# Patient Record
Sex: FEMALE | Race: WHITE
[De-identification: names, ages, dates, MRNs, and addresses within clinical notes are randomized per-mention and may not be internally consistent; named-entity substitution may affect disease eponyms.]

---

## 2022-04-20 ENCOUNTER — HOSPITAL ENCOUNTER (OUTPATIENT)
Dept: HOSPITAL 100 - MTLAB | Age: 52
Discharge: HOME | End: 2022-04-20
Payer: COMMERCIAL

## 2022-04-20 DIAGNOSIS — R00.2: ICD-10-CM

## 2022-04-20 DIAGNOSIS — E78.5: ICD-10-CM

## 2022-04-20 DIAGNOSIS — R76.8: ICD-10-CM

## 2022-04-20 DIAGNOSIS — M06.4: Primary | ICD-10-CM

## 2022-04-20 LAB
ALANINE AMINOTRANSFER ALT/SGPT: 25 U/L (ref 13–56)
ALBUMIN SERPL-MCNC: 3.6 G/DL (ref 3.2–5)
ALKALINE PHOSPHATASE: 73 U/L (ref 45–117)
ANION GAP: 8 (ref 5–15)
APTT PPP: 26.8 SECONDS (ref 24.1–36.2)
AST(SGOT): 19 U/L (ref 15–37)
BUN SERPL-MCNC: 10 MG/DL (ref 7–18)
BUN/CREAT RATIO: 11 RATIO (ref 10–20)
CALCIUM SERPL-MCNC: 8.6 MG/DL (ref 8.5–10.1)
CARBON DIOXIDE: 24 MMOL/L (ref 21–32)
CHLORIDE: 106 MMOL/L (ref 98–107)
CRP SERPL-MCNC: 9.89 MG/L (ref 0–3)
DEPRECATED RDW RBC: 42.1 FL (ref 35.1–43.9)
ERYTHROCYTE [DISTWIDTH] IN BLOOD: 13.3 % (ref 11.6–14.6)
EST GLOM FILT RATE - AFR AMER: 85 ML/MIN (ref 60–?)
EXAGEN: (no result)
GLOBULIN: 4 G/DL (ref 2.2–4.2)
GLUCOSE: 107 MG/DL (ref 74–106)
HCT VFR BLD AUTO: 38.1 % (ref 37–47)
HEMOGLOBIN: 13 G/DL (ref 12–15)
HGB BLD-MCNC: 13 G/DL (ref 12–15)
IMMATURE GRANULOCYTES COUNT: 0.03 X10^3/UL (ref 0–0)
MCV RBC: 87.2 FL (ref 81–99)
MEAN CORP HGB CONC: 34.1 G/DL (ref 32–36)
MEAN PLATELET VOL.: 9.8 FL (ref 6.2–12)
NRBC FLAGGED BY ANALYZER: 0 % (ref 0–5)
PLATELET # BLD: 247 K/MM3 (ref 150–450)
PLATELET COUNT: 247 K/MM3 (ref 150–450)
POTASSIUM: 3.8 MMOL/L (ref 3.5–5.1)
PROTHROMBIN TIME (PROTIME)PT.: 11.7 SECONDS (ref 11.7–14.9)
RBC # BLD AUTO: 4.37 M/MM3 (ref 4.2–5.4)
RBC DISTRIBUTION WIDTH CV: 13.3 % (ref 11.6–14.6)
RBC DISTRIBUTION WIDTH SD: 42.1 FL (ref 35.1–43.9)
SP GR UR: 1.01 (ref 1–1.03)
URINE PRESERVATIVE: (no result)
WBC # BLD AUTO: 9.5 K/MM3 (ref 4.4–11)
WHITE BLOOD COUNT: 9.5 K/MM3 (ref 4.4–11)

## 2022-04-20 PROCEDURE — 82570 ASSAY OF URINE CREATININE: CPT

## 2022-04-20 PROCEDURE — 84156 ASSAY OF PROTEIN URINE: CPT

## 2022-04-20 PROCEDURE — 86140 C-REACTIVE PROTEIN: CPT

## 2022-04-20 PROCEDURE — 85598 HEXAGNAL PHOSPH PLTLT NEUTRL: CPT

## 2022-04-20 PROCEDURE — 85025 COMPLETE CBC W/AUTO DIFF WBC: CPT

## 2022-04-20 PROCEDURE — 87340 HEPATITIS B SURFACE AG IA: CPT

## 2022-04-20 PROCEDURE — 80053 COMPREHEN METABOLIC PANEL: CPT

## 2022-04-20 PROCEDURE — 81002 URINALYSIS NONAUTO W/O SCOPE: CPT

## 2022-04-20 PROCEDURE — 85730 THROMBOPLASTIN TIME PARTIAL: CPT

## 2022-04-20 PROCEDURE — 86803 HEPATITIS C AB TEST: CPT

## 2022-04-20 PROCEDURE — 85652 RBC SED RATE AUTOMATED: CPT

## 2022-04-20 PROCEDURE — 85670 THROMBIN TIME PLASMA: CPT

## 2022-04-20 PROCEDURE — 85610 PROTHROMBIN TIME: CPT

## 2022-04-20 PROCEDURE — 86706 HEP B SURFACE ANTIBODY: CPT

## 2022-04-21 ENCOUNTER — HOSPITAL ENCOUNTER (OUTPATIENT)
Age: 52
Discharge: HOME | End: 2022-04-21
Payer: COMMERCIAL

## 2022-04-21 DIAGNOSIS — J30.9: ICD-10-CM

## 2022-04-21 DIAGNOSIS — R00.2: ICD-10-CM

## 2022-04-21 DIAGNOSIS — R76.8: ICD-10-CM

## 2022-04-21 DIAGNOSIS — E78.5: ICD-10-CM

## 2022-04-21 DIAGNOSIS — M06.4: Primary | ICD-10-CM

## 2022-04-21 LAB
CREAT UR-MCNC: 33 MG/DL
PROT UR-MCNC: < 6 MG/DL (ref ?–11.9)
PROT/CREAT UR: (no result) MG/G CRE (ref 0–200)

## 2022-04-21 PROCEDURE — 82570 ASSAY OF URINE CREATININE: CPT

## 2022-04-21 PROCEDURE — 84156 ASSAY OF PROTEIN URINE: CPT

## 2022-04-22 LAB
DILUTE RUSSELL VIPER VENOM: 41.2 SEC (ref 0–47)
PTT-LA: 33.3 SEC (ref 0–51.9)
SCREEN DRVVT: 41.2 SEC (ref 0–47)

## 2023-04-17 ENCOUNTER — OFFICE VISIT (OUTPATIENT)
Dept: PRIMARY CARE | Facility: CLINIC | Age: 53
End: 2023-04-17
Payer: COMMERCIAL

## 2023-04-17 ENCOUNTER — TELEPHONE (OUTPATIENT)
Dept: PRIMARY CARE | Facility: CLINIC | Age: 53
End: 2023-04-17

## 2023-04-17 ENCOUNTER — LAB (OUTPATIENT)
Dept: LAB | Facility: LAB | Age: 53
End: 2023-04-17
Payer: COMMERCIAL

## 2023-04-17 VITALS
DIASTOLIC BLOOD PRESSURE: 80 MMHG | OXYGEN SATURATION: 99 % | HEART RATE: 68 BPM | HEIGHT: 64 IN | WEIGHT: 185.3 LBS | BODY MASS INDEX: 31.63 KG/M2 | SYSTOLIC BLOOD PRESSURE: 118 MMHG

## 2023-04-17 DIAGNOSIS — E53.8 VITAMIN B12 DEFICIENCY: ICD-10-CM

## 2023-04-17 DIAGNOSIS — E78.2 HYPERLIPEMIA, MIXED: Primary | ICD-10-CM

## 2023-04-17 DIAGNOSIS — E78.2 HYPERLIPEMIA, MIXED: ICD-10-CM

## 2023-04-17 PROBLEM — R55 SYNCOPAL EPISODES: Status: ACTIVE | Noted: 2023-04-17

## 2023-04-17 PROBLEM — R79.89 LOW VITAMIN D LEVEL: Status: ACTIVE | Noted: 2023-04-17

## 2023-04-17 PROBLEM — R76.8 POSITIVE ANA (ANTINUCLEAR ANTIBODY): Status: ACTIVE | Noted: 2023-04-17

## 2023-04-17 PROBLEM — R00.2 PALPITATIONS: Status: ACTIVE | Noted: 2023-04-17

## 2023-04-17 PROBLEM — H02.88A MEIBOMIAN GLAND DYSFUNCTION (MGD) OF UPPER AND LOWER LIDS OF BOTH EYES: Status: ACTIVE | Noted: 2017-05-01

## 2023-04-17 PROBLEM — R55 SYNCOPAL EPISODES: Status: RESOLVED | Noted: 2023-04-17 | Resolved: 2023-04-17

## 2023-04-17 PROBLEM — H02.88B MEIBOMIAN GLAND DYSFUNCTION (MGD) OF UPPER AND LOWER LIDS OF BOTH EYES: Status: ACTIVE | Noted: 2017-05-01

## 2023-04-17 PROBLEM — M25.50 JOINT PAIN: Status: ACTIVE | Noted: 2023-04-17

## 2023-04-17 LAB
ALANINE AMINOTRANSFERASE (SGPT) (U/L) IN SER/PLAS: 26 U/L (ref 7–45)
ALBUMIN (G/DL) IN SER/PLAS: 4.4 G/DL (ref 3.4–5)
ALKALINE PHOSPHATASE (U/L) IN SER/PLAS: 119 U/L (ref 33–110)
ANION GAP IN SER/PLAS: 10 MMOL/L (ref 10–20)
ASPARTATE AMINOTRANSFERASE (SGOT) (U/L) IN SER/PLAS: 26 U/L (ref 9–39)
BASOPHILS (10*3/UL) IN BLOOD BY AUTOMATED COUNT: 0.03 X10E9/L (ref 0–0.1)
BASOPHILS/100 LEUKOCYTES IN BLOOD BY AUTOMATED COUNT: 0.4 % (ref 0–2)
BILIRUBIN TOTAL (MG/DL) IN SER/PLAS: 0.6 MG/DL (ref 0–1.2)
CALCIUM (MG/DL) IN SER/PLAS: 9.6 MG/DL (ref 8.6–10.3)
CARBON DIOXIDE, TOTAL (MMOL/L) IN SER/PLAS: 30 MMOL/L (ref 21–32)
CHLORIDE (MMOL/L) IN SER/PLAS: 104 MMOL/L (ref 98–107)
CHOLESTEROL (MG/DL) IN SER/PLAS: 166 MG/DL (ref 0–199)
CHOLESTEROL IN HDL (MG/DL) IN SER/PLAS: 55 MG/DL
CHOLESTEROL/HDL RATIO: 3
COBALAMIN (VITAMIN B12) (PG/ML) IN SER/PLAS: 631 PG/ML (ref 211–911)
CREATININE (MG/DL) IN SER/PLAS: 0.77 MG/DL (ref 0.5–1.05)
EOSINOPHILS (10*3/UL) IN BLOOD BY AUTOMATED COUNT: 0.24 X10E9/L (ref 0–0.7)
EOSINOPHILS/100 LEUKOCYTES IN BLOOD BY AUTOMATED COUNT: 3 % (ref 0–6)
ERYTHROCYTE DISTRIBUTION WIDTH (RATIO) BY AUTOMATED COUNT: 13.4 % (ref 11.5–14.5)
ERYTHROCYTE MEAN CORPUSCULAR HEMOGLOBIN CONCENTRATION (G/DL) BY AUTOMATED: 31.7 G/DL (ref 32–36)
ERYTHROCYTE MEAN CORPUSCULAR VOLUME (FL) BY AUTOMATED COUNT: 87 FL (ref 80–100)
ERYTHROCYTES (10*6/UL) IN BLOOD BY AUTOMATED COUNT: 4.75 X10E12/L (ref 4–5.2)
GFR FEMALE: >90 ML/MIN/1.73M2
GLUCOSE (MG/DL) IN SER/PLAS: 92 MG/DL (ref 74–99)
HEMATOCRIT (%) IN BLOOD BY AUTOMATED COUNT: 41.3 % (ref 36–46)
HEMOGLOBIN (G/DL) IN BLOOD: 13.1 G/DL (ref 12–16)
IMMATURE GRANULOCYTES/100 LEUKOCYTES IN BLOOD BY AUTOMATED COUNT: 0.4 % (ref 0–0.9)
LDL: 78 MG/DL (ref 0–99)
LEUKOCYTES (10*3/UL) IN BLOOD BY AUTOMATED COUNT: 8.1 X10E9/L (ref 4.4–11.3)
LYMPHOCYTES (10*3/UL) IN BLOOD BY AUTOMATED COUNT: 2.87 X10E9/L (ref 1.2–4.8)
LYMPHOCYTES/100 LEUKOCYTES IN BLOOD BY AUTOMATED COUNT: 35.6 % (ref 13–44)
MONOCYTES (10*3/UL) IN BLOOD BY AUTOMATED COUNT: 0.42 X10E9/L (ref 0.1–1)
MONOCYTES/100 LEUKOCYTES IN BLOOD BY AUTOMATED COUNT: 5.2 % (ref 2–10)
NEUTROPHILS (10*3/UL) IN BLOOD BY AUTOMATED COUNT: 4.47 X10E9/L (ref 1.2–7.7)
NEUTROPHILS/100 LEUKOCYTES IN BLOOD BY AUTOMATED COUNT: 55.4 % (ref 40–80)
PLATELETS (10*3/UL) IN BLOOD AUTOMATED COUNT: 219 X10E9/L (ref 150–450)
POTASSIUM (MMOL/L) IN SER/PLAS: 4.2 MMOL/L (ref 3.5–5.3)
PROTEIN TOTAL: 6.3 G/DL (ref 6.4–8.2)
SODIUM (MMOL/L) IN SER/PLAS: 140 MMOL/L (ref 136–145)
THYROTROPIN (MIU/L) IN SER/PLAS BY DETECTION LIMIT <= 0.05 MIU/L: 2.29 MIU/L (ref 0.44–3.98)
TRIGLYCERIDE (MG/DL) IN SER/PLAS: 163 MG/DL (ref 0–149)
UREA NITROGEN (MG/DL) IN SER/PLAS: 12 MG/DL (ref 6–23)
VLDL: 33 MG/DL (ref 0–40)

## 2023-04-17 PROCEDURE — 99213 OFFICE O/P EST LOW 20 MIN: CPT | Performed by: FAMILY MEDICINE

## 2023-04-17 PROCEDURE — 1036F TOBACCO NON-USER: CPT | Performed by: FAMILY MEDICINE

## 2023-04-17 PROCEDURE — 80061 LIPID PANEL: CPT

## 2023-04-17 PROCEDURE — 80053 COMPREHEN METABOLIC PANEL: CPT

## 2023-04-17 PROCEDURE — 84443 ASSAY THYROID STIM HORMONE: CPT

## 2023-04-17 PROCEDURE — 82607 VITAMIN B-12: CPT

## 2023-04-17 PROCEDURE — 85025 COMPLETE CBC W/AUTO DIFF WBC: CPT

## 2023-04-17 PROCEDURE — 36415 COLL VENOUS BLD VENIPUNCTURE: CPT

## 2023-04-17 RX ORDER — VIT C/E/ZN/COPPR/LUTEIN/ZEAXAN 250MG-90MG
CAPSULE ORAL
COMMUNITY
End: 2023-11-06 | Stop reason: ALTCHOICE

## 2023-04-17 RX ORDER — ALBUTEROL SULFATE 90 UG/1
2 AEROSOL, METERED RESPIRATORY (INHALATION) EVERY 6 HOURS PRN
COMMUNITY
Start: 2021-03-09 | End: 2023-11-06 | Stop reason: ALTCHOICE

## 2023-04-17 RX ORDER — ATORVASTATIN CALCIUM 40 MG/1
40 TABLET, FILM COATED ORAL DAILY
COMMUNITY
End: 2023-04-17 | Stop reason: SINTOL

## 2023-04-17 RX ORDER — ROSUVASTATIN CALCIUM 10 MG/1
10 TABLET, COATED ORAL DAILY
Qty: 30 TABLET | Refills: 1 | Status: SHIPPED | OUTPATIENT
Start: 2023-04-17 | End: 2023-05-19 | Stop reason: ALTCHOICE

## 2023-04-17 RX ORDER — METOPROLOL SUCCINATE 50 MG/1
50 TABLET, EXTENDED RELEASE ORAL
COMMUNITY
Start: 2021-08-20 | End: 2023-06-05

## 2023-04-17 RX ORDER — CHOLECALCIFEROL (VITAMIN D3) 25 MCG
TABLET,CHEWABLE ORAL EVERY 24 HOURS
COMMUNITY
End: 2023-11-06 | Stop reason: ALTCHOICE

## 2023-04-17 ASSESSMENT — PATIENT HEALTH QUESTIONNAIRE - PHQ9
1. LITTLE INTEREST OR PLEASURE IN DOING THINGS: NOT AT ALL
SUM OF ALL RESPONSES TO PHQ9 QUESTIONS 1 AND 2: 0
2. FEELING DOWN, DEPRESSED OR HOPELESS: NOT AT ALL

## 2023-04-17 NOTE — PROGRESS NOTES
Subjective   Lona Dawson is a 52 y.o. female who presents for No chief complaint on file..  Here for follow up high chol, vitamin B12 def.  She has been working on diet/exercise.  She is interested in either cutting back or changing her cholesterol medication based on recent labs.  She has noticed some issues with constipation since starting the medication.      She goes to GYN - Dr Mcgill [facility] - mammogram last fall.      Cologuard neg 11/7/2022.                Objective   Visit Vitals  /80 (BP Location: Left arm, Patient Position: Sitting)   Pulse 68      Physical Exam  Vitals reviewed.   Constitutional:       General: She is not in acute distress.  Cardiovascular:      Rate and Rhythm: Normal rate and regular rhythm.      Heart sounds: No murmur heard.  Pulmonary:      Effort: Pulmonary effort is normal. No respiratory distress.      Breath sounds: Normal breath sounds.   Skin:     General: Skin is warm and dry.   Neurological:      General: No focal deficit present.      Mental Status: She is alert. Mental status is at baseline.         Assessment/Plan   Problem List Items Addressed This Visit          Endocrine/Metabolic    Vitamin B12 deficiency    Relevant Orders    CBC and Auto Differential    Comprehensive Metabolic Panel    Lipid Panel    TSH with reflex to Free T4 if abnormal    Vitamin B12    Follow Up In Primary Care       Other    Hyperlipemia, mixed - Primary    Relevant Orders    CBC and Auto Differential    Comprehensive Metabolic Panel    Lipid Panel    TSH with reflex to Free T4 if abnormal    Vitamin B12    Follow Up In Primary Care          Diamond Gill MD

## 2023-04-17 NOTE — TELEPHONE ENCOUNTER
Phone call to pt with all above.  Pt is agreeable to starting rosuvastatin 10 mg daily.  Pt advised a 30 day supply would be sent in to make sure she can tolerate.  Pt voiced understanding and appreciation.

## 2023-04-17 NOTE — TELEPHONE ENCOUNTER
----- Message from Diamond Gill MD sent at 4/17/2023 12:53 PM EDT -----  Please let patient know that overall her labs were ok - cholesterol is a little higher than it was in January but still much better than it was 6 months ago.  If she would like to try to either cut the atorvastatin dose in half or switch to rosuvastatin 10 mg we could try that.  Thanks.

## 2023-05-19 ENCOUNTER — OFFICE VISIT (OUTPATIENT)
Dept: PRIMARY CARE | Facility: CLINIC | Age: 53
End: 2023-05-19
Payer: COMMERCIAL

## 2023-05-19 VITALS
DIASTOLIC BLOOD PRESSURE: 74 MMHG | BODY MASS INDEX: 31.24 KG/M2 | HEIGHT: 64 IN | SYSTOLIC BLOOD PRESSURE: 124 MMHG | WEIGHT: 183 LBS | HEART RATE: 80 BPM

## 2023-05-19 DIAGNOSIS — E78.2 HYPERLIPEMIA, MIXED: ICD-10-CM

## 2023-05-19 DIAGNOSIS — N30.01 ACUTE CYSTITIS WITH HEMATURIA: ICD-10-CM

## 2023-05-19 DIAGNOSIS — R30.0 DYSURIA: Primary | ICD-10-CM

## 2023-05-19 LAB
POC APPEARANCE, URINE: CLEAR
POC BILIRUBIN, URINE: NEGATIVE
POC BLOOD, URINE: ABNORMAL
POC COLOR, URINE: YELLOW
POC GLUCOSE, URINE: NEGATIVE MG/DL
POC KETONES, URINE: NEGATIVE MG/DL
POC LEUKOCYTES, URINE: NEGATIVE
POC NITRITE,URINE: NEGATIVE
POC PH, URINE: 6 PH
POC PROTEIN, URINE: NEGATIVE MG/DL
POC SPECIFIC GRAVITY, URINE: 1.01
POC UROBILINOGEN, URINE: 0.2 EU/DL

## 2023-05-19 PROCEDURE — 81003 URINALYSIS AUTO W/O SCOPE: CPT | Performed by: FAMILY MEDICINE

## 2023-05-19 PROCEDURE — 1036F TOBACCO NON-USER: CPT | Performed by: FAMILY MEDICINE

## 2023-05-19 PROCEDURE — 87086 URINE CULTURE/COLONY COUNT: CPT

## 2023-05-19 PROCEDURE — 99213 OFFICE O/P EST LOW 20 MIN: CPT | Performed by: FAMILY MEDICINE

## 2023-05-19 RX ORDER — CIPROFLOXACIN 250 MG/1
250 TABLET, FILM COATED ORAL 2 TIMES DAILY
Qty: 14 TABLET | Refills: 0 | Status: SHIPPED | OUTPATIENT
Start: 2023-05-19 | End: 2023-05-26

## 2023-05-19 RX ORDER — ATORVASTATIN CALCIUM 20 MG/1
20 TABLET, FILM COATED ORAL DAILY
Qty: 30 TABLET | Refills: 5 | Status: SHIPPED | OUTPATIENT
Start: 2023-05-19 | End: 2023-11-06 | Stop reason: SDUPTHER

## 2023-05-19 RX ORDER — FLUCONAZOLE 150 MG/1
150 TABLET ORAL ONCE
Qty: 1 TABLET | Refills: 1 | Status: SHIPPED | OUTPATIENT
Start: 2023-05-19 | End: 2023-05-19

## 2023-05-19 ASSESSMENT — ENCOUNTER SYMPTOMS
HEMATURIA: 1
FREQUENCY: 1
NAUSEA: 1
VOMITING: 1
CHILLS: 1
FLANK PAIN: 1
DYSURIA: 1

## 2023-05-19 NOTE — PROGRESS NOTES
Subjective   Lona Dawson is a 52 y.o. female who presents for Follow-up (Issues with urinating / low grade fever. States she has a dull pain after voidng and back pain. ).  Here c/o UTI symptoms - burning with urination, had some trouble urinating on Monday, did have some nausea and vomiting after that.  Since then she has had some low back pain, aching, possible low grade fever.  Yesterday had some pain after urinating.  Three weeks ago she did have some blood one time.  She has had kidney stones in the past.      She also mentions that she would like to stop the crestor and switch back to atorvastatin - we will do 20 mg daily and see how she tolerates that.      Difficulty Urinating   This is a new problem. The current episode started 1 to 4 weeks ago. The problem occurs every urination. The problem has been waxing and waning. The quality of the pain is described as stabbing. The pain is at a severity of 3/10. There has been no fever. She is Sexually active. There is A history of pyelonephritis. Associated symptoms include chills, flank pain, frequency, hematuria, hesitancy, nausea and vomiting.           Objective   Visit Vitals  /74 (BP Location: Left arm, Patient Position: Sitting)   Pulse 80      Physical Exam  Vitals reviewed.   Constitutional:       General: She is not in acute distress.  Pulmonary:      Effort: Pulmonary effort is normal. No respiratory distress.   Skin:     General: Skin is warm and dry.   Neurological:      General: No focal deficit present.      Mental Status: She is alert. Mental status is at baseline.         Assessment/Plan   Problem List Items Addressed This Visit          Other    Hyperlipemia, mixed    Relevant Medications    atorvastatin (Lipitor) 20 mg tablet     Other Visit Diagnoses       Dysuria    -  Primary    Relevant Orders    POCT UA Automated manually resulted (Completed)    Urine Culture    Acute cystitis with hematuria        Relevant Medications     ciprofloxacin (Cipro) 250 mg tablet    fluconazole (Diflucan) 150 mg tablet               Diamond Gill MD

## 2023-05-20 LAB — URINE CULTURE: NORMAL

## 2023-05-23 ENCOUNTER — TELEPHONE (OUTPATIENT)
Dept: PRIMARY CARE | Facility: CLINIC | Age: 53
End: 2023-05-23
Payer: COMMERCIAL

## 2023-05-23 NOTE — TELEPHONE ENCOUNTER
----- Message from Diamond Gill MD sent at 5/21/2023  2:34 PM EDT -----  Please let patient know that her urine culture was negative - how is she feeling?

## 2023-06-02 ENCOUNTER — TELEPHONE (OUTPATIENT)
Dept: PRIMARY CARE | Facility: CLINIC | Age: 53
End: 2023-06-02
Payer: COMMERCIAL

## 2023-06-02 DIAGNOSIS — R30.0 DYSURIA: Primary | ICD-10-CM

## 2023-06-02 NOTE — TELEPHONE ENCOUNTER
Patient would like a referral for urology, preferably a female. I suggested Dr. Blevins in [place] and she was okay with that. She would like an order placed to have her urine rechecked if possible and she can go up to the hospital and have it done.

## 2023-06-02 NOTE — TELEPHONE ENCOUNTER
Patient stated she was in a couple weeks ago and was treated for UTI. She said she was starting to feel better, but now she is not. She said she is starting to have pain with urination again and feeling run down. She wanted to know if you want to see her again or if she should see a specialist?

## 2023-06-02 NOTE — TELEPHONE ENCOUNTER
I would suggest we refer to urology.  We could order another urine test in the meantime if she would like.

## 2023-06-03 ENCOUNTER — LAB (OUTPATIENT)
Dept: LAB | Facility: LAB | Age: 53
End: 2023-06-03
Payer: COMMERCIAL

## 2023-06-03 DIAGNOSIS — R00.2 PALPITATIONS: Primary | ICD-10-CM

## 2023-06-03 DIAGNOSIS — R30.0 DYSURIA: ICD-10-CM

## 2023-06-03 LAB
APPEARANCE, URINE: CLEAR
BILIRUBIN, URINE: NEGATIVE
BLOOD, URINE: NEGATIVE
COLOR, URINE: NORMAL
GLUCOSE, URINE: NEGATIVE MG/DL
KETONES, URINE: NEGATIVE MG/DL
LEUKOCYTE ESTERASE, URINE: NEGATIVE
NITRITE, URINE: NEGATIVE
PH, URINE: 6 (ref 5–8)
PROTEIN, URINE: NEGATIVE MG/DL
SPECIFIC GRAVITY, URINE: 1.01 (ref 1–1.03)
UROBILINOGEN, URINE: <2 MG/DL (ref 0–1.9)

## 2023-06-03 PROCEDURE — 81003 URINALYSIS AUTO W/O SCOPE: CPT

## 2023-06-03 PROCEDURE — 87086 URINE CULTURE/COLONY COUNT: CPT

## 2023-06-04 LAB — URINE CULTURE: NORMAL

## 2023-06-05 RX ORDER — METOPROLOL SUCCINATE 50 MG/1
TABLET, EXTENDED RELEASE ORAL
Qty: 90 TABLET | Refills: 3 | Status: SHIPPED | OUTPATIENT
Start: 2023-06-05 | End: 2024-05-06 | Stop reason: SDUPTHER

## 2023-06-05 NOTE — TELEPHONE ENCOUNTER
----- Message from Diamond Gill MD sent at 6/5/2023  7:54 AM EDT -----  Please let patient know that her urine culture was negative, so I would encourage follow up with urology as discussed.  Thanks.

## 2023-08-07 ENCOUNTER — HOSPITAL ENCOUNTER (OUTPATIENT)
Dept: HOSPITAL 100 - US | Age: 53
Discharge: HOME | End: 2023-08-07
Payer: COMMERCIAL

## 2023-08-07 DIAGNOSIS — N93.9: Primary | ICD-10-CM

## 2023-08-07 PROCEDURE — 76856 US EXAM PELVIC COMPLETE: CPT

## 2023-08-07 PROCEDURE — 76830 TRANSVAGINAL US NON-OB: CPT

## 2023-10-17 ENCOUNTER — APPOINTMENT (OUTPATIENT)
Dept: PRIMARY CARE | Facility: CLINIC | Age: 53
End: 2023-10-17
Payer: COMMERCIAL

## 2023-11-06 ENCOUNTER — OFFICE VISIT (OUTPATIENT)
Dept: PRIMARY CARE | Facility: CLINIC | Age: 53
End: 2023-11-06
Payer: COMMERCIAL

## 2023-11-06 ENCOUNTER — LAB (OUTPATIENT)
Dept: LAB | Facility: LAB | Age: 53
End: 2023-11-06
Payer: COMMERCIAL

## 2023-11-06 VITALS
WEIGHT: 192.9 LBS | OXYGEN SATURATION: 100 % | HEART RATE: 69 BPM | DIASTOLIC BLOOD PRESSURE: 76 MMHG | SYSTOLIC BLOOD PRESSURE: 126 MMHG | HEIGHT: 64 IN | BODY MASS INDEX: 32.93 KG/M2

## 2023-11-06 DIAGNOSIS — E55.9 VITAMIN D DEFICIENCY: ICD-10-CM

## 2023-11-06 DIAGNOSIS — E53.8 VITAMIN B12 DEFICIENCY: ICD-10-CM

## 2023-11-06 DIAGNOSIS — R00.2 PALPITATIONS: ICD-10-CM

## 2023-11-06 DIAGNOSIS — E78.2 HYPERLIPEMIA, MIXED: ICD-10-CM

## 2023-11-06 DIAGNOSIS — E78.2 HYPERLIPEMIA, MIXED: Primary | ICD-10-CM

## 2023-11-06 PROBLEM — H02.88A MEIBOMIAN GLAND DYSFUNCTION (MGD) OF UPPER AND LOWER LIDS OF BOTH EYES: Status: RESOLVED | Noted: 2017-05-01 | Resolved: 2023-11-06

## 2023-11-06 PROBLEM — H02.88B MEIBOMIAN GLAND DYSFUNCTION (MGD) OF UPPER AND LOWER LIDS OF BOTH EYES: Status: RESOLVED | Noted: 2017-05-01 | Resolved: 2023-11-06

## 2023-11-06 LAB
25(OH)D3 SERPL-MCNC: 31 NG/ML (ref 30–100)
ALBUMIN SERPL BCP-MCNC: 4.2 G/DL (ref 3.4–5)
ALP SERPL-CCNC: 128 U/L (ref 33–110)
ALT SERPL W P-5'-P-CCNC: 40 U/L (ref 7–45)
ANION GAP SERPL CALC-SCNC: 11 MMOL/L (ref 10–20)
AST SERPL W P-5'-P-CCNC: 26 U/L (ref 9–39)
BASOPHILS # BLD AUTO: 0.04 X10*3/UL (ref 0–0.1)
BASOPHILS NFR BLD AUTO: 0.4 %
BILIRUB SERPL-MCNC: 0.6 MG/DL (ref 0–1.2)
BUN SERPL-MCNC: 13 MG/DL (ref 6–23)
CALCIUM SERPL-MCNC: 9.1 MG/DL (ref 8.6–10.3)
CHLORIDE SERPL-SCNC: 105 MMOL/L (ref 98–107)
CHOLEST SERPL-MCNC: 173 MG/DL (ref 0–199)
CHOLESTEROL/HDL RATIO: 2.9
CO2 SERPL-SCNC: 28 MMOL/L (ref 21–32)
CREAT SERPL-MCNC: 0.77 MG/DL (ref 0.5–1.05)
EOSINOPHIL # BLD AUTO: 0.13 X10*3/UL (ref 0–0.7)
EOSINOPHIL NFR BLD AUTO: 1.4 %
ERYTHROCYTE [DISTWIDTH] IN BLOOD BY AUTOMATED COUNT: 13.2 % (ref 11.5–14.5)
GFR SERPL CREATININE-BSD FRML MDRD: >90 ML/MIN/1.73M*2
GLUCOSE SERPL-MCNC: 96 MG/DL (ref 74–99)
HCT VFR BLD AUTO: 40.3 % (ref 36–46)
HDLC SERPL-MCNC: 60 MG/DL
HGB BLD-MCNC: 12.9 G/DL (ref 12–16)
IMM GRANULOCYTES # BLD AUTO: 0.03 X10*3/UL (ref 0–0.7)
IMM GRANULOCYTES NFR BLD AUTO: 0.3 % (ref 0–0.9)
LDLC SERPL CALC-MCNC: 72 MG/DL
LYMPHOCYTES # BLD AUTO: 3.49 X10*3/UL (ref 1.2–4.8)
LYMPHOCYTES NFR BLD AUTO: 38.4 %
MCH RBC QN AUTO: 28 PG (ref 26–34)
MCHC RBC AUTO-ENTMCNC: 32 G/DL (ref 32–36)
MCV RBC AUTO: 88 FL (ref 80–100)
MONOCYTES # BLD AUTO: 0.45 X10*3/UL (ref 0.1–1)
MONOCYTES NFR BLD AUTO: 5 %
NEUTROPHILS # BLD AUTO: 4.95 X10*3/UL (ref 1.2–7.7)
NEUTROPHILS NFR BLD AUTO: 54.5 %
NON HDL CHOLESTEROL: 113 MG/DL (ref 0–149)
NRBC BLD-RTO: 0 /100 WBCS (ref 0–0)
PLATELET # BLD AUTO: 191 X10*3/UL (ref 150–450)
POTASSIUM SERPL-SCNC: 4.1 MMOL/L (ref 3.5–5.3)
PROT SERPL-MCNC: 6.5 G/DL (ref 6.4–8.2)
RBC # BLD AUTO: 4.6 X10*6/UL (ref 4–5.2)
SODIUM SERPL-SCNC: 140 MMOL/L (ref 136–145)
TRIGL SERPL-MCNC: 205 MG/DL (ref 0–149)
TSH SERPL-ACNC: 2.03 MIU/L (ref 0.44–3.98)
VIT B12 SERPL-MCNC: 496 PG/ML (ref 211–911)
VLDL: 41 MG/DL (ref 0–40)
WBC # BLD AUTO: 9.1 X10*3/UL (ref 4.4–11.3)

## 2023-11-06 PROCEDURE — 1036F TOBACCO NON-USER: CPT | Performed by: FAMILY MEDICINE

## 2023-11-06 PROCEDURE — 82607 VITAMIN B-12: CPT

## 2023-11-06 PROCEDURE — 80061 LIPID PANEL: CPT

## 2023-11-06 PROCEDURE — 80053 COMPREHEN METABOLIC PANEL: CPT

## 2023-11-06 PROCEDURE — 82306 VITAMIN D 25 HYDROXY: CPT

## 2023-11-06 PROCEDURE — 99214 OFFICE O/P EST MOD 30 MIN: CPT | Performed by: FAMILY MEDICINE

## 2023-11-06 PROCEDURE — 36415 COLL VENOUS BLD VENIPUNCTURE: CPT

## 2023-11-06 PROCEDURE — 84443 ASSAY THYROID STIM HORMONE: CPT

## 2023-11-06 PROCEDURE — 85025 COMPLETE CBC W/AUTO DIFF WBC: CPT

## 2023-11-06 RX ORDER — BISMUTH SUBSALICYLATE 262 MG
1 TABLET,CHEWABLE ORAL DAILY
COMMUNITY

## 2023-11-06 RX ORDER — ESTRADIOL 0.1 MG/G
2 CREAM VAGINAL DAILY
COMMUNITY

## 2023-11-06 RX ORDER — ATORVASTATIN CALCIUM 20 MG/1
20 TABLET, FILM COATED ORAL DAILY
Qty: 90 TABLET | Refills: 1 | Status: SHIPPED | OUTPATIENT
Start: 2023-11-06 | End: 2024-05-06 | Stop reason: SDUPTHER

## 2023-11-06 NOTE — PROGRESS NOTES
Subjective   Patient ID: Lona Dawson is a 53 y.o. female who presents for check up.    HPI     Review of Systems    Objective   LMP  (LMP Unknown) Comment: pt was recently on OCP x 2 years    Physical Exam    Assessment/Plan

## 2023-11-06 NOTE — PROGRESS NOTES
Subjective   Lona Dawson is a 53 y.o. female who presents for No chief complaint on file..  Here for follow up high chol, vitamin B12 def.  Since she was here last she has been to urology in [place] for recurrent bladder infections - she had testing and was started on estrogen cream which she uses some of the time.  She states that she has stopped taking vitamin D and vitamin b and is taking a multivitamin now.     She goes to GYN - Dr Mcgill [facility] - mammogram last week.      Cologuard neg 11/7/2022.              Objective   Visit Vitals  /76 (BP Location: Left arm, Patient Position: Sitting, BP Cuff Size: Adult)   Pulse 69      Physical Exam  Vitals reviewed.   Constitutional:       General: She is not in acute distress.  Cardiovascular:      Rate and Rhythm: Normal rate and regular rhythm.      Heart sounds: No murmur heard.  Pulmonary:      Effort: Pulmonary effort is normal. No respiratory distress.      Breath sounds: Normal breath sounds.   Skin:     General: Skin is warm and dry.   Neurological:      General: No focal deficit present.      Mental Status: She is alert. Mental status is at baseline.         Assessment/Plan   Problem List Items Addressed This Visit       Hyperlipemia, mixed - Primary    Relevant Medications    atorvastatin (Lipitor) 20 mg tablet    Other Relevant Orders    CBC and Auto Differential    Comprehensive Metabolic Panel    Lipid Panel    TSH with reflex to Free T4 if abnormal    Vitamin B12    Vitamin D 25-Hydroxy,Total (for eval of Vitamin D levels)    Palpitations    Vitamin B12 deficiency    Relevant Orders    CBC and Auto Differential    Comprehensive Metabolic Panel    Lipid Panel    TSH with reflex to Free T4 if abnormal    Vitamin B12    Vitamin D 25-Hydroxy,Total (for eval of Vitamin D levels)     Other Visit Diagnoses       Vitamin D deficiency        Relevant Orders    Vitamin D 25-Hydroxy,Total (for eval of Vitamin D levels)               Diamond BONILLA  MD Bridget

## 2023-11-08 DIAGNOSIS — R74.8 ELEVATED ALKALINE PHOSPHATASE LEVEL: Primary | ICD-10-CM

## 2023-11-08 NOTE — RESULT ENCOUNTER NOTE
Please let patient know that her labs show an elevated alk phos - this is an enzyme that may come from liver or bone.  I would recommend we do some further testing on this with [facility] lab test.  Her cholesterol is a little higher, but still good.  Otherwise labs were ok.  Thanks

## 2023-11-09 ENCOUNTER — LAB (OUTPATIENT)
Dept: LAB | Facility: LAB | Age: 53
End: 2023-11-09
Payer: COMMERCIAL

## 2023-11-09 DIAGNOSIS — R74.8 ELEVATED ALKALINE PHOSPHATASE LEVEL: ICD-10-CM

## 2023-11-09 PROCEDURE — 36415 COLL VENOUS BLD VENIPUNCTURE: CPT

## 2023-11-09 PROCEDURE — 84080 ASSAY ALKALINE PHOSPHATASES: CPT

## 2023-11-13 LAB
ALP BONE SERPL-CCNC: 60 U/L (ref 0–55)
ALP LIVER SERPL-CCNC: 83 U/L (ref 0–94)
ALP OTHER SERPL-CCNC: 0 U/L
ALP SERPL-CCNC: 143 U/L (ref 40–120)

## 2023-11-14 NOTE — RESULT ENCOUNTER NOTE
Please let patient know that her alk phos testing shows that the elevation is coming from bone - I would be happy to see her to discuss this and answer questions - this may be due to something as simple as vitamin D def - her vitamin D was at the low end of normal, or it could be more complicated and often we will refer to endocrinology for further evaluation.  Thanks.

## 2023-11-16 ENCOUNTER — OFFICE VISIT (OUTPATIENT)
Dept: PRIMARY CARE | Facility: CLINIC | Age: 53
End: 2023-11-16
Payer: COMMERCIAL

## 2023-11-16 VITALS
HEART RATE: 74 BPM | WEIGHT: 194.2 LBS | SYSTOLIC BLOOD PRESSURE: 112 MMHG | DIASTOLIC BLOOD PRESSURE: 78 MMHG | BODY MASS INDEX: 33.16 KG/M2 | HEIGHT: 64 IN | OXYGEN SATURATION: 97 %

## 2023-11-16 DIAGNOSIS — R74.8 ELEVATED ALKALINE PHOSPHATASE LEVEL: Primary | ICD-10-CM

## 2023-11-16 PROCEDURE — 1036F TOBACCO NON-USER: CPT | Performed by: FAMILY MEDICINE

## 2023-11-16 PROCEDURE — 99213 OFFICE O/P EST LOW 20 MIN: CPT | Performed by: FAMILY MEDICINE

## 2023-11-16 NOTE — PROGRESS NOTES
Subjective   Lona Dawson is a 53 y.o. female who presents for No chief complaint on file..  Here for follow up recent labs.  Her alk phos was elevated - we did a fractionated test which showed that it is mostly coming from bone.  We discussed some possible etiologies of this and monitoring vs referral to endocrinology and she is interested in referral to endocrinology.              Objective   Visit Vitals  /78 (BP Location: Left arm, Patient Position: Sitting, BP Cuff Size: Adult)   Pulse 74      Physical Exam  Vitals reviewed.   Constitutional:       General: She is not in acute distress.  Cardiovascular:      Rate and Rhythm: Normal rate.   Pulmonary:      Effort: Pulmonary effort is normal. No respiratory distress.   Skin:     General: Skin is warm and dry.   Neurological:      General: No focal deficit present.      Mental Status: She is alert. Mental status is at baseline.        Latest Reference Range & Units 11/06/23 09:23   GLUCOSE 74 - 99 mg/dL 96   SODIUM 136 - 145 mmol/L 140   POTASSIUM 3.5 - 5.3 mmol/L 4.1   CHLORIDE 98 - 107 mmol/L 105   Bicarbonate 21 - 32 mmol/L 28   Anion Gap 10 - 20 mmol/L 11   Blood Urea Nitrogen 6 - 23 mg/dL 13   Creatinine 0.50 - 1.05 mg/dL 0.77   EGFR >60 mL/min/1.73m*2 >90   Calcium 8.6 - 10.3 mg/dL 9.1   Albumin 3.4 - 5.0 g/dL 4.2   Alkaline Phosphatase 33 - 110 U/L 128 (H)   ALT 7 - 45 U/L 40   AST 9 - 39 U/L 26   Bilirubin Total 0.0 - 1.2 mg/dL 0.6   HDL CHOLESTEROL mg/dL 60.0   Cholesterol/HDL Ratio  2.9   LDL Calculated <=99 mg/dL 72   VLDL 0 - 40 mg/dL 41 (H)   TRIGLYCERIDES 0 - 149 mg/dL 205 (H)   Non HDL Cholesterol 0 - 149 mg/dL 113   Total Protein 6.4 - 8.2 g/dL 6.5   CHOLESTEROL 0 - 199 mg/dL 173   Vitamin B12 211 - 911 pg/mL 496   Thyroid Stimulating Hormone 0.44 - 3.98 mIU/L 2.03   Vitamin D, 25-Hydroxy, Total 30 - 100 ng/mL 31   WBC 4.4 - 11.3 x10*3/uL 9.1   nRBC 0.0 - 0.0 /100 WBCs 0.0   RBC 4.00 - 5.20 x10*6/uL 4.60   HEMOGLOBIN 12.0 - 16.0 g/dL 12.9    HEMATOCRIT 36.0 - 46.0 % 40.3   MCV 80 - 100 fL 88   MCH 26.0 - 34.0 pg 28.0   MCHC 32.0 - 36.0 g/dL 32.0   RED CELL DISTRIBUTION WIDTH 11.5 - 14.5 % 13.2   Platelets 150 - 450 x10*3/uL 191   Neutrophils % 40.0 - 80.0 % 54.5   Immature Granulocytes %, Automated 0.0 - 0.9 % 0.3   Lymphocytes % 13.0 - 44.0 % 38.4   Monocytes % 2.0 - 10.0 % 5.0   Eosinophils % 0.0 - 6.0 % 1.4   Basophils % 0.0 - 2.0 % 0.4   Neutrophils Absolute 1.20 - 7.70 x10*3/uL 4.95   Immature Granulocytes Absolute, Automated 0.00 - 0.70 x10*3/uL 0.03   Lymphocytes Absolute 1.20 - 4.80 x10*3/uL 3.49   Monocytes Absolute 0.10 - 1.00 x10*3/uL 0.45   Eosinophils Absolute 0.00 - 0.70 x10*3/uL 0.13   Basophils Absolute 0.00 - 0.10 x10*3/uL 0.04   (H): Data is abnormally high   Latest Reference Range & Units 11/09/23 11:09   Liver Fraction: 0 - 94 U/L 83   Bone Fraction: 0 - 55 U/L 60 (H)   Alkaline Phosphatase, Other U/L 0   Alkaline Phosphatase 40 - 120 U/L 143 (H)   (H): Data is abnormally high  Assessment/Plan   Problem List Items Addressed This Visit    None  Visit Diagnoses       Elevated alkaline phosphatase level    -  Primary    Relevant Orders    Referral to Endocrinology               Diamond Gill MD

## 2023-11-16 NOTE — PROGRESS NOTES
Subjective   Patient ID: Lona Dawson is a 53 y.o. female who presents for lab discussion.     HPI     Review of Systems    Objective   LMP  (LMP Unknown) Comment: pt was recently on OCP x 2 years    Physical Exam    Assessment/Plan

## 2023-12-19 ENCOUNTER — LAB (OUTPATIENT)
Dept: LAB | Facility: LAB | Age: 53
End: 2023-12-19
Payer: COMMERCIAL

## 2023-12-19 ENCOUNTER — OFFICE VISIT (OUTPATIENT)
Dept: ENDOCRINOLOGY | Facility: CLINIC | Age: 53
End: 2023-12-19
Payer: COMMERCIAL

## 2023-12-19 VITALS
HEART RATE: 95 BPM | HEIGHT: 64 IN | WEIGHT: 191 LBS | DIASTOLIC BLOOD PRESSURE: 82 MMHG | BODY MASS INDEX: 32.61 KG/M2 | SYSTOLIC BLOOD PRESSURE: 122 MMHG

## 2023-12-19 DIAGNOSIS — N91.2 AMENORRHEA: Primary | ICD-10-CM

## 2023-12-19 DIAGNOSIS — N91.2 AMENORRHEA: ICD-10-CM

## 2023-12-19 DIAGNOSIS — R74.8 ELEVATED ALKALINE PHOSPHATASE LEVEL: ICD-10-CM

## 2023-12-19 LAB
ALBUMIN SERPL BCP-MCNC: 4.2 G/DL (ref 3.4–5)
ALP SERPL-CCNC: 135 U/L (ref 33–110)
ALT SERPL W P-5'-P-CCNC: 48 U/L (ref 7–45)
ANION GAP SERPL CALC-SCNC: 12 MMOL/L (ref 10–20)
AST SERPL W P-5'-P-CCNC: 32 U/L (ref 9–39)
BILIRUB SERPL-MCNC: 0.4 MG/DL (ref 0–1.2)
BUN SERPL-MCNC: 12 MG/DL (ref 6–23)
CALCIUM SERPL-MCNC: 9.1 MG/DL (ref 8.6–10.3)
CHLORIDE SERPL-SCNC: 103 MMOL/L (ref 98–107)
CO2 SERPL-SCNC: 28 MMOL/L (ref 21–32)
CREAT SERPL-MCNC: 0.71 MG/DL (ref 0.5–1.05)
GFR SERPL CREATININE-BSD FRML MDRD: >90 ML/MIN/1.73M*2
GLUCOSE SERPL-MCNC: 79 MG/DL (ref 74–99)
POTASSIUM SERPL-SCNC: 5.4 MMOL/L (ref 3.5–5.3)
PROT SERPL-MCNC: 6.8 G/DL (ref 6.4–8.2)
SODIUM SERPL-SCNC: 138 MMOL/L (ref 136–145)

## 2023-12-19 PROCEDURE — 36415 COLL VENOUS BLD VENIPUNCTURE: CPT

## 2023-12-19 PROCEDURE — 84445 ASSAY OF TSI GLOBULIN: CPT

## 2023-12-19 PROCEDURE — 83002 ASSAY OF GONADOTROPIN (LH): CPT

## 2023-12-19 PROCEDURE — 99204 OFFICE O/P NEW MOD 45 MIN: CPT | Performed by: INTERNAL MEDICINE

## 2023-12-19 PROCEDURE — 83970 ASSAY OF PARATHORMONE: CPT

## 2023-12-19 PROCEDURE — 80053 COMPREHEN METABOLIC PANEL: CPT

## 2023-12-19 PROCEDURE — 1036F TOBACCO NON-USER: CPT | Performed by: INTERNAL MEDICINE

## 2023-12-19 PROCEDURE — 82670 ASSAY OF TOTAL ESTRADIOL: CPT

## 2023-12-19 PROCEDURE — 83519 RIA NONANTIBODY: CPT

## 2023-12-19 PROCEDURE — 82977 ASSAY OF GGT: CPT

## 2023-12-19 PROCEDURE — 83001 ASSAY OF GONADOTROPIN (FSH): CPT

## 2023-12-19 NOTE — PATIENT INSTRUCTIONS
Thank you for choosing [facility]  for your health care needs.  If you have any questions, concerns or medical needs, please feel free to contact our office at [phone].    Please ensure you complete your blood work one week before the next scheduled appointment.    To obtain blood tests today  To bone a bone scan to assess for paget's disease   Will follow up with results

## 2023-12-19 NOTE — PROGRESS NOTES
"Subjective   Patient ID: Lona Dawson is a 53 y.o. female who presents for Elevated alkaline phosphatase level and Thyroid Problem.  HPI    The patient states that she saw rheumatology years ago for inflammation.    She stopped OCP one year ago.  She was on it for irregular menstrual cycles.      She denies bone pain, myalgia or cramps.  Pain was lessened after starting a multivitamin.      Exericse regimen - lifts weights  Denies falls or fractures    Occasional alcohol use   Denies steroid use     Hot flahses - admit; daily     Supplements:  Zinc, echinacea   She was previously on vitamins d and b     Left ankle swollen when hot and humid    Objective   Visit Vitals  /82 (BP Location: Left arm, Patient Position: Sitting, BP Cuff Size: Adult)   Pulse 95   Ht 1.626 m (5' 4\")   Wt 86.6 kg (191 lb)   LMP  (LMP Unknown) Comment: pt was recently on OCP x 2 years   BMI 32.79 kg/m²   OB Status Postmenopausal   Smoking Status Never   BSA 1.98 m²       Physical Exam  Vitals and nursing note reviewed.   Constitutional:       General: She is not in acute distress.     Appearance: Normal appearance. She is normal weight.   HENT:      Head: Normocephalic and atraumatic.      Nose: Nose normal.      Mouth/Throat:      Mouth: Mucous membranes are moist.   Eyes:      Extraocular Movements: Extraocular movements intact.   Cardiovascular:      Rate and Rhythm: Normal rate and regular rhythm.   Pulmonary:      Effort: Pulmonary effort is normal.      Breath sounds: Normal breath sounds.   Musculoskeletal:         General: Normal range of motion.   Skin:     General: Skin is warm.   Neurological:      Mental Status: She is alert and oriented to person, place, and time.   Psychiatric:         Mood and Affect: Mood normal.       Lab Results   Component Value Date    TSH 2.03 11/06/2023    THYROIDPAB <28 07/19/2021     Lab Results   Component Value Date    GLUCOSE 79 12/19/2023    CALCIUM 9.1 12/19/2023     12/19/2023    " K 5.4 (H) 12/19/2023    CO2 28 12/19/2023     12/19/2023    BUN 12 12/19/2023    CREATININE 0.71 12/19/2023     Other labs have been reviewed via EMR.     Assessment/Plan   53-year-old female presents for the evaluation for the management of an elevated alkaline phosphatase.  Upon blood tests obtained by her primary care physician, this elevated alkaline phosphatase was determined to be of bone etiology.  Other lab data for potential secondary causes have since been normal.    Elevated alkaline phosphatase level  To obtain blood tests today  To bone a bone scan to assess for paget's disease   Counseled that workup thus far has been unrevealing    Will follow up with results

## 2023-12-20 LAB
ESTRADIOL SERPL-MCNC: <19 PG/ML
FSH SERPL-ACNC: 77.7 IU/L
GGT SERPL-CCNC: 27 U/L (ref 5–55)
LH SERPL-ACNC: 36.3 IU/L
PTH-INTACT SERPL-MCNC: 32.1 PG/ML (ref 18.5–88)

## 2023-12-21 PROBLEM — R74.8 ELEVATED ALKALINE PHOSPHATASE LEVEL: Status: ACTIVE | Noted: 2023-12-21

## 2023-12-21 LAB — TSH RECEP AB SER-ACNC: <1.1 IU/L

## 2023-12-22 LAB — TSI SER-ACNC: <1 TSI INDEX

## 2023-12-22 NOTE — ASSESSMENT & PLAN NOTE
To obtain blood tests today  To bone a bone scan to assess for paget's disease   Counseled that workup thus far has been unrevealing    Will follow up with results

## 2023-12-22 NOTE — RESULT ENCOUNTER NOTE
Labs have been reviewed.  The alkaline phosphatase is elevated.  The potassium and ALT are slightly elevated, which has not historically been the case. All other values are within normal limits, including the GGT which is elevated in bone disease.  I will await the bone scan.

## 2024-01-03 ENCOUNTER — HOSPITAL ENCOUNTER (OUTPATIENT)
Dept: RADIOLOGY | Facility: HOSPITAL | Age: 54
Discharge: HOME | End: 2024-01-03
Payer: COMMERCIAL

## 2024-01-03 DIAGNOSIS — R74.8 ELEVATED ALKALINE PHOSPHATASE LEVEL: ICD-10-CM

## 2024-01-03 PROCEDURE — 78306 BONE IMAGING WHOLE BODY: CPT | Performed by: STUDENT IN AN ORGANIZED HEALTH CARE EDUCATION/TRAINING PROGRAM

## 2024-01-03 PROCEDURE — A9503 TC99M MEDRONATE: HCPCS | Performed by: INTERNAL MEDICINE

## 2024-01-03 PROCEDURE — 78306 BONE IMAGING WHOLE BODY: CPT

## 2024-01-03 PROCEDURE — 3430000001 HC RX 343 DIAGNOSTIC RADIOPHARMACEUTICALS: Performed by: INTERNAL MEDICINE

## 2024-01-03 RX ADMIN — TECHNETIUM TC 99M MEDRONATE 26.3 MILLICURIE: 25 INJECTION, POWDER, FOR SOLUTION INTRAVENOUS at 08:20

## 2024-01-10 ENCOUNTER — TELEPHONE (OUTPATIENT)
Dept: ENDOCRINOLOGY | Facility: CLINIC | Age: 54
End: 2024-01-10
Payer: COMMERCIAL

## 2024-01-26 DIAGNOSIS — R74.8 ELEVATED ALKALINE PHOSPHATASE LEVEL: Primary | ICD-10-CM

## 2024-01-26 NOTE — RESULT ENCOUNTER NOTE
Body scan has been reviewed. There is no evidence of Paget's disease, which is a cause of the elevated alkaline phosphatase.   Changes are consistent with arthritis.  Causes such as osteomalacia, Paget disease of bone, hyperparathyroidism and hyperthyroidism have been ruled out as endocrine causes.  Vitamin D was normal ruling out osteomalacia.  The alkaline phosphatase value may vary and be <4 times the upper limit of normal and thus will be monitored over time.      For follow up in 4 months with repeat labs if interested.

## 2024-01-30 ENCOUNTER — OFFICE VISIT (OUTPATIENT)
Dept: PRIMARY CARE | Facility: CLINIC | Age: 54
End: 2024-01-30
Payer: COMMERCIAL

## 2024-01-30 VITALS
HEIGHT: 64 IN | OXYGEN SATURATION: 97 % | HEART RATE: 74 BPM | WEIGHT: 196.3 LBS | DIASTOLIC BLOOD PRESSURE: 78 MMHG | BODY MASS INDEX: 33.51 KG/M2 | SYSTOLIC BLOOD PRESSURE: 132 MMHG

## 2024-01-30 DIAGNOSIS — J01.90 ACUTE SINUSITIS, RECURRENCE NOT SPECIFIED, UNSPECIFIED LOCATION: Primary | ICD-10-CM

## 2024-01-30 DIAGNOSIS — H69.90 DYSFUNCTION OF EUSTACHIAN TUBE, UNSPECIFIED LATERALITY: ICD-10-CM

## 2024-01-30 PROCEDURE — 1036F TOBACCO NON-USER: CPT | Performed by: FAMILY MEDICINE

## 2024-01-30 PROCEDURE — 99213 OFFICE O/P EST LOW 20 MIN: CPT | Performed by: FAMILY MEDICINE

## 2024-01-30 RX ORDER — PREDNISONE 20 MG/1
20 TABLET ORAL DAILY
Qty: 5 TABLET | Refills: 0 | Status: SHIPPED | OUTPATIENT
Start: 2024-01-30 | End: 2024-02-04

## 2024-01-30 RX ORDER — FLUCONAZOLE 150 MG/1
150 TABLET ORAL ONCE
Qty: 1 TABLET | Refills: 0 | Status: SHIPPED | OUTPATIENT
Start: 2024-01-30 | End: 2024-01-30

## 2024-01-30 RX ORDER — AMOXICILLIN 875 MG/1
875 TABLET, FILM COATED ORAL 2 TIMES DAILY
Qty: 20 TABLET | Refills: 0 | Status: SHIPPED | OUTPATIENT
Start: 2024-01-30 | End: 2024-02-09

## 2024-01-30 NOTE — PROGRESS NOTES
Subjective   Lona Dawson is a 53 y.o. female who presents for No chief complaint on file..  Here c/o ear pressure/feels plugged.  Did have a dizzy spell over the weekend for about an hour - felt like things were spinning.  Has not had cold symptoms recently.                Objective   Visit Vitals  /78 (BP Location: Left arm, Patient Position: Sitting, BP Cuff Size: Adult)   Pulse 74      Physical Exam  Vitals reviewed.   Constitutional:       General: She is not in acute distress.  HENT:      Right Ear: Tympanic membrane normal.      Left Ear: Tympanic membrane normal.   Cardiovascular:      Rate and Rhythm: Normal rate and regular rhythm.      Heart sounds: No murmur heard.  Pulmonary:      Effort: Pulmonary effort is normal. No respiratory distress.      Breath sounds: Normal breath sounds.   Skin:     General: Skin is warm and dry.   Neurological:      General: No focal deficit present.      Mental Status: She is alert. Mental status is at baseline.         Assessment/Plan   Problem List Items Addressed This Visit    None  Visit Diagnoses       Acute sinusitis, recurrence not specified, unspecified location    -  Primary    Relevant Medications    amoxicillin (Amoxil) 875 mg tablet    predniSONE (Deltasone) 20 mg tablet    fluconazole (Diflucan) 150 mg tablet    Dysfunction of Eustachian tube, unspecified laterality        Relevant Medications    amoxicillin (Amoxil) 875 mg tablet    predniSONE (Deltasone) 20 mg tablet               Diamond Gill MD

## 2024-01-30 NOTE — PROGRESS NOTES
Subjective   Patient ID: Lona Dawson is a 53 y.o. female who presents for ear pressure and plugged. No Sinus issues. Dizzy spelled this weekend room was spinning. Lasted about an hour. No dizzy spells since.     HPI     Review of Systems    Objective   LMP  (LMP Unknown) Comment: pt was recently on OCP x 2 years    Physical Exam    Assessment/Plan

## 2024-05-06 ENCOUNTER — OFFICE VISIT (OUTPATIENT)
Dept: PRIMARY CARE | Facility: CLINIC | Age: 54
End: 2024-05-06
Payer: COMMERCIAL

## 2024-05-06 VITALS
HEIGHT: 64 IN | OXYGEN SATURATION: 98 % | HEART RATE: 76 BPM | SYSTOLIC BLOOD PRESSURE: 130 MMHG | WEIGHT: 192.9 LBS | BODY MASS INDEX: 32.93 KG/M2 | DIASTOLIC BLOOD PRESSURE: 82 MMHG

## 2024-05-06 DIAGNOSIS — E53.8 VITAMIN B12 DEFICIENCY: ICD-10-CM

## 2024-05-06 DIAGNOSIS — R00.2 PALPITATIONS: ICD-10-CM

## 2024-05-06 DIAGNOSIS — E55.9 VITAMIN D DEFICIENCY: Primary | ICD-10-CM

## 2024-05-06 DIAGNOSIS — E78.2 HYPERLIPEMIA, MIXED: ICD-10-CM

## 2024-05-06 PROCEDURE — 99213 OFFICE O/P EST LOW 20 MIN: CPT | Performed by: FAMILY MEDICINE

## 2024-05-06 PROCEDURE — 1036F TOBACCO NON-USER: CPT | Performed by: FAMILY MEDICINE

## 2024-05-06 RX ORDER — ATORVASTATIN CALCIUM 20 MG/1
20 TABLET, FILM COATED ORAL DAILY
Qty: 90 TABLET | Refills: 3 | Status: SHIPPED | OUTPATIENT
Start: 2024-05-06 | End: 2025-05-01

## 2024-05-06 RX ORDER — METOPROLOL SUCCINATE 50 MG/1
50 TABLET, EXTENDED RELEASE ORAL DAILY
Qty: 90 TABLET | Refills: 3 | Status: SHIPPED | OUTPATIENT
Start: 2024-05-06

## 2024-05-06 NOTE — PROGRESS NOTES
Subjective   Lnoa Dawson is a 53 y.o. female who presents for No chief complaint on file..  Here for follow up high chol, vitamin B12 def, elevated alk phos.             Objective   Visit Vitals  /82 (BP Location: Right arm, Patient Position: Sitting, BP Cuff Size: Adult)   Pulse 76      Physical Exam  Vitals reviewed.   Constitutional:       General: She is not in acute distress.  Cardiovascular:      Rate and Rhythm: Normal rate and regular rhythm.      Heart sounds: No murmur heard.  Pulmonary:      Effort: Pulmonary effort is normal. No respiratory distress.      Breath sounds: Normal breath sounds.   Skin:     General: Skin is warm and dry.   Neurological:      General: No focal deficit present.      Mental Status: She is alert. Mental status is at baseline.         Assessment/Plan   Problem List Items Addressed This Visit       Hyperlipemia, mixed    Relevant Medications    atorvastatin (Lipitor) 20 mg tablet    Other Relevant Orders    Comprehensive Metabolic Panel    CBC and Auto Differential    Lipid Panel    TSH with reflex to Free T4 if abnormal    Vitamin B12    Vitamin D 25-Hydroxy,Total (for eval of Vitamin D levels)    Palpitations    Relevant Medications    metoprolol succinate XL (Toprol-XL) 50 mg 24 hr tablet    Vitamin B12 deficiency    Relevant Orders    Comprehensive Metabolic Panel    CBC and Auto Differential    Lipid Panel    TSH with reflex to Free T4 if abnormal    Vitamin B12    Vitamin D 25-Hydroxy,Total (for eval of Vitamin D levels)     Other Visit Diagnoses       Vitamin D deficiency    -  Primary    Relevant Orders    Comprehensive Metabolic Panel    CBC and Auto Differential    Lipid Panel    TSH with reflex to Free T4 if abnormal    Vitamin B12    Vitamin D 25-Hydroxy,Total (for eval of Vitamin D levels)               Diamond Gill MD

## 2024-05-06 NOTE — PROGRESS NOTES
Subjective   Patient ID: Lona Dawson is a 53 y.o. female who presents for 6 month check up.     HPI     Review of Systems    Objective   LMP  (LMP Unknown) Comment: pt was recently on OCP x 2 years    Physical Exam    Assessment/Plan

## 2024-05-28 ENCOUNTER — LAB (OUTPATIENT)
Dept: LAB | Facility: LAB | Age: 54
End: 2024-05-28
Payer: COMMERCIAL

## 2024-05-28 DIAGNOSIS — R74.8 ELEVATED ALKALINE PHOSPHATASE LEVEL: ICD-10-CM

## 2024-05-28 LAB
ALBUMIN SERPL BCP-MCNC: 4.2 G/DL (ref 3.4–5)
ALP SERPL-CCNC: 105 U/L (ref 33–110)
ANION GAP SERPL CALC-SCNC: 12 MMOL/L (ref 10–20)
BUN SERPL-MCNC: 13 MG/DL (ref 6–23)
CALCIUM SERPL-MCNC: 9.1 MG/DL (ref 8.6–10.3)
CHLORIDE SERPL-SCNC: 106 MMOL/L (ref 98–107)
CO2 SERPL-SCNC: 28 MMOL/L (ref 21–32)
CREAT SERPL-MCNC: 0.76 MG/DL (ref 0.5–1.05)
EGFRCR SERPLBLD CKD-EPI 2021: >90 ML/MIN/1.73M*2
GLUCOSE SERPL-MCNC: 86 MG/DL (ref 74–99)
PHOSPHATE SERPL-MCNC: 3.2 MG/DL (ref 2.5–4.9)
POTASSIUM SERPL-SCNC: 3.8 MMOL/L (ref 3.5–5.3)
SODIUM SERPL-SCNC: 142 MMOL/L (ref 136–145)

## 2024-05-28 PROCEDURE — 84075 ASSAY ALKALINE PHOSPHATASE: CPT

## 2024-05-28 PROCEDURE — 36415 COLL VENOUS BLD VENIPUNCTURE: CPT

## 2024-05-28 PROCEDURE — 80069 RENAL FUNCTION PANEL: CPT

## 2024-06-03 ENCOUNTER — OFFICE VISIT (OUTPATIENT)
Dept: ENDOCRINOLOGY | Facility: CLINIC | Age: 54
End: 2024-06-03
Payer: COMMERCIAL

## 2024-06-03 VITALS
HEIGHT: 64 IN | SYSTOLIC BLOOD PRESSURE: 124 MMHG | HEART RATE: 73 BPM | WEIGHT: 189 LBS | DIASTOLIC BLOOD PRESSURE: 70 MMHG | BODY MASS INDEX: 32.27 KG/M2

## 2024-06-03 DIAGNOSIS — R74.8 ELEVATED ALKALINE PHOSPHATASE LEVEL: Primary | ICD-10-CM

## 2024-06-03 PROCEDURE — 99213 OFFICE O/P EST LOW 20 MIN: CPT | Performed by: INTERNAL MEDICINE

## 2024-06-03 NOTE — PROGRESS NOTES
"Subjective   Patient ID: Lona Dawson is a 53 y.o. female who presents for follow up for an elevated alkaline phosphatase.      The patient states that she saw rheumatology years ago for inflammation.    She has been routinely exercising - Treadmill three times per week - 1 mile;   Walks/jogs    She will get feet pain occasionally     Other symptoms:  Cramps - denies  Bowel movements - regular; twice daily     Weight - lost   Goal weight of 150 lbs     She gets hot fashes in the morning   Denies night sweats     Review of Systems   All other systems reviewed and are negative.    Objective   Visit Vitals  LMP  (LMP Unknown) Comment: pt was recently on OCP x 2 years   OB Status Postmenopausal   Smoking Status Never     Visit Vitals  /70 (BP Location: Left arm, Patient Position: Sitting, BP Cuff Size: Adult)   Pulse 73   Ht 1.626 m (5' 4\")   Wt 85.7 kg (189 lb)   LMP  (LMP Unknown) Comment: pt was recently on OCP x 2 years   BMI 32.44 kg/m²   OB Status Postmenopausal   Smoking Status Never   BSA 1.97 m²         Physical Exam  Vitals and nursing note reviewed.   Constitutional:       General: She is not in acute distress.     Appearance: Normal appearance. She is normal weight.   HENT:      Head: Normocephalic and atraumatic.      Nose: Nose normal.      Mouth/Throat:      Mouth: Mucous membranes are moist.   Eyes:      Extraocular Movements: Extraocular movements intact.   Cardiovascular:      Rate and Rhythm: Normal rate and regular rhythm.   Pulmonary:      Effort: Pulmonary effort is normal.      Breath sounds: Normal breath sounds.   Musculoskeletal:         General: Normal range of motion.   Skin:     General: Skin is warm.   Neurological:      Mental Status: She is alert and oriented to person, place, and time.   Psychiatric:         Mood and Affect: Mood normal.     Lab Results   Component Value Date    TSH 2.03 11/06/2023    THYROIDPAB <28 07/19/2021     Lab Results   Component Value Date    GLUCOSE " 86 05/28/2024    CALCIUM 9.1 05/28/2024     05/28/2024    K 3.8 05/28/2024    CO2 28 05/28/2024     05/28/2024    BUN 13 05/28/2024    CREATININE 0.76 05/28/2024       Alkaline phosphatase normal as of 5/28/24    Whole body scan from 1/3/24  FINDINGS:  There is no abnormal radiotracer uptake to suggest Paget's disease.      Symmetrically increased radiotracer uptake are seen in the bilateral  tibia.      Additional regions of heterogeneous radiotracer uptake are seen in  the bilateral shoulders and thoracolumbar spine, consistent with  arthritic/degenerative changes.        Assessment/Plan   53-year-old female presents for follow up for an elevated alkaline phosphatase.  Upon blood tests obtained by her primary care physician, this elevated alkaline phosphatase was determined to be of bone etiology.  Other lab data for potential secondary causes have since been normal.  Whole body scan was negative for Paget's disease.  The most recent alkaline phosphatase was normal.    Elevated alkaline phosphatase level  The most recent alkaline phosphonate test was normal  The bone scan was negative for Paget's   PCP can continue to monitor alkaline phosphatase levels

## 2024-06-03 NOTE — PATIENT INSTRUCTIONS
Thank you for choosing [facility]  for your health care needs.  If you have any questions, concerns or medical needs, please feel free to contact our office at [phone].    Please ensure you complete your blood work one week before the next scheduled appointment.    The alkaline phosphonate test was normal  The bone scan was negative for Paget's   PCP can continue to monitor alkaline phosphatase levels

## 2024-06-06 NOTE — ASSESSMENT & PLAN NOTE
The most recent alkaline phosphonate test was normal  The bone scan was negative for Paget's   PCP can continue to monitor alkaline phosphatase levels

## 2024-09-24 ENCOUNTER — TELEPHONE (OUTPATIENT)
Age: 54
End: 2024-09-24
Payer: COMMERCIAL

## 2024-09-24 NOTE — TELEPHONE ENCOUNTER
Patient called and stated that she scheduled an appointment for Nov 6, 2023 for a annual visit and it wasn't coded correctly. I did see when I was able to go back in that it should have been done as a annual wellness visit. Can you amend the appointment to state it was a annual wellness visit and code it as it should have been?     Thank yo u

## 2024-11-02 ENCOUNTER — LAB (OUTPATIENT)
Dept: LAB | Facility: LAB | Age: 54
End: 2024-11-02
Payer: COMMERCIAL

## 2024-11-02 DIAGNOSIS — E78.2 HYPERLIPEMIA, MIXED: ICD-10-CM

## 2024-11-02 DIAGNOSIS — E53.8 VITAMIN B12 DEFICIENCY: ICD-10-CM

## 2024-11-02 DIAGNOSIS — E55.9 VITAMIN D DEFICIENCY: ICD-10-CM

## 2024-11-02 LAB
25(OH)D3 SERPL-MCNC: 31 NG/ML (ref 30–100)
ALBUMIN SERPL BCP-MCNC: 4.3 G/DL (ref 3.4–5)
ALP SERPL-CCNC: 110 U/L (ref 33–110)
ALT SERPL W P-5'-P-CCNC: 22 U/L (ref 7–45)
ANION GAP SERPL CALC-SCNC: 8 MMOL/L (ref 10–20)
AST SERPL W P-5'-P-CCNC: 22 U/L (ref 9–39)
BASOPHILS # BLD AUTO: 0.03 X10*3/UL (ref 0–0.1)
BASOPHILS NFR BLD AUTO: 0.4 %
BILIRUB SERPL-MCNC: 0.6 MG/DL (ref 0–1.2)
BUN SERPL-MCNC: 9 MG/DL (ref 6–23)
CALCIUM SERPL-MCNC: 9.3 MG/DL (ref 8.6–10.3)
CHLORIDE SERPL-SCNC: 105 MMOL/L (ref 98–107)
CHOLEST SERPL-MCNC: 203 MG/DL (ref 0–199)
CHOLESTEROL/HDL RATIO: 3.6
CO2 SERPL-SCNC: 30 MMOL/L (ref 21–32)
CREAT SERPL-MCNC: 0.73 MG/DL (ref 0.5–1.05)
EGFRCR SERPLBLD CKD-EPI 2021: >90 ML/MIN/1.73M*2
EOSINOPHIL # BLD AUTO: 0.14 X10*3/UL (ref 0–0.7)
EOSINOPHIL NFR BLD AUTO: 1.8 %
ERYTHROCYTE [DISTWIDTH] IN BLOOD BY AUTOMATED COUNT: 13.2 % (ref 11.5–14.5)
GLUCOSE SERPL-MCNC: 99 MG/DL (ref 74–99)
HCT VFR BLD AUTO: 41.4 % (ref 36–46)
HDLC SERPL-MCNC: 57 MG/DL
HGB BLD-MCNC: 13.4 G/DL (ref 12–16)
IMM GRANULOCYTES # BLD AUTO: 0.02 X10*3/UL (ref 0–0.7)
IMM GRANULOCYTES NFR BLD AUTO: 0.3 % (ref 0–0.9)
LDLC SERPL CALC-MCNC: 92 MG/DL
LYMPHOCYTES # BLD AUTO: 2.94 X10*3/UL (ref 1.2–4.8)
LYMPHOCYTES NFR BLD AUTO: 36.8 %
MCH RBC QN AUTO: 28 PG (ref 26–34)
MCHC RBC AUTO-ENTMCNC: 32.4 G/DL (ref 32–36)
MCV RBC AUTO: 87 FL (ref 80–100)
MONOCYTES # BLD AUTO: 0.41 X10*3/UL (ref 0.1–1)
MONOCYTES NFR BLD AUTO: 5.1 %
NEUTROPHILS # BLD AUTO: 4.45 X10*3/UL (ref 1.2–7.7)
NEUTROPHILS NFR BLD AUTO: 55.6 %
NON HDL CHOLESTEROL: 146 MG/DL (ref 0–149)
NRBC BLD-RTO: 0 /100 WBCS (ref 0–0)
PLATELET # BLD AUTO: 192 X10*3/UL (ref 150–450)
POTASSIUM SERPL-SCNC: 4.2 MMOL/L (ref 3.5–5.3)
PROT SERPL-MCNC: 6.6 G/DL (ref 6.4–8.2)
RBC # BLD AUTO: 4.78 X10*6/UL (ref 4–5.2)
SODIUM SERPL-SCNC: 139 MMOL/L (ref 136–145)
TRIGL SERPL-MCNC: 271 MG/DL (ref 0–149)
TSH SERPL-ACNC: 2.3 MIU/L (ref 0.44–3.98)
VIT B12 SERPL-MCNC: 375 PG/ML (ref 211–911)
VLDL: 54 MG/DL (ref 0–40)
WBC # BLD AUTO: 8 X10*3/UL (ref 4.4–11.3)

## 2024-11-02 PROCEDURE — 36415 COLL VENOUS BLD VENIPUNCTURE: CPT

## 2024-11-04 ENCOUNTER — APPOINTMENT (OUTPATIENT)
Dept: PRIMARY CARE | Facility: CLINIC | Age: 54
End: 2024-11-04
Payer: COMMERCIAL

## 2024-11-04 VITALS
SYSTOLIC BLOOD PRESSURE: 126 MMHG | BODY MASS INDEX: 32.81 KG/M2 | DIASTOLIC BLOOD PRESSURE: 80 MMHG | HEART RATE: 67 BPM | OXYGEN SATURATION: 97 % | HEIGHT: 64 IN | WEIGHT: 192.2 LBS

## 2024-11-04 DIAGNOSIS — E53.8 VITAMIN B12 DEFICIENCY: ICD-10-CM

## 2024-11-04 DIAGNOSIS — Z00.00 WELL ADULT EXAM: Primary | ICD-10-CM

## 2024-11-04 DIAGNOSIS — E55.9 VITAMIN D DEFICIENCY: ICD-10-CM

## 2024-11-04 DIAGNOSIS — E78.2 HYPERLIPEMIA, MIXED: ICD-10-CM

## 2024-11-04 PROCEDURE — 1036F TOBACCO NON-USER: CPT | Performed by: FAMILY MEDICINE

## 2024-11-04 PROCEDURE — 3008F BODY MASS INDEX DOCD: CPT | Performed by: FAMILY MEDICINE

## 2024-11-04 PROCEDURE — 99396 PREV VISIT EST AGE 40-64: CPT | Performed by: FAMILY MEDICINE

## 2024-11-04 RX ORDER — ATORVASTATIN CALCIUM 10 MG/1
10 TABLET, FILM COATED ORAL DAILY
Qty: 90 TABLET | Refills: 3 | Status: SHIPPED | OUTPATIENT
Start: 2024-11-04 | End: 2025-10-30

## 2024-11-04 NOTE — PROGRESS NOTES
Subjective   Lona Dawson is a 54 y.o. female who presents for No chief complaint on file..  Here for wellness visit/follow up high chol, vitamin B12 def, elevated alk phos. She states that she is having some side effects with the atorvastatin - some burning in her feet at night - she has cut back to half dose and that has helped.      She gets mammograms with her GYN.    Cologestevan 11/22 -negative.                Objective   Visit Vitals  /80 (BP Location: Left arm, Patient Position: Sitting, BP Cuff Size: Adult)   Pulse 67      Physical Exam  Vitals reviewed.   Constitutional:       General: She is not in acute distress.  Cardiovascular:      Rate and Rhythm: Normal rate and regular rhythm.      Heart sounds: No murmur heard.  Pulmonary:      Effort: Pulmonary effort is normal. No respiratory distress.      Breath sounds: Normal breath sounds.   Skin:     General: Skin is warm and dry.   Neurological:      General: No focal deficit present.      Mental Status: She is alert. Mental status is at baseline.        Latest Reference Range & Units 11/02/24 08:49   GLUCOSE 74 - 99 mg/dL 99   SODIUM 136 - 145 mmol/L 139   POTASSIUM 3.5 - 5.3 mmol/L 4.2   CHLORIDE 98 - 107 mmol/L 105   Bicarbonate 21 - 32 mmol/L 30   Anion Gap 10 - 20 mmol/L 8 (L)   Blood Urea Nitrogen 6 - 23 mg/dL 9   Creatinine 0.50 - 1.05 mg/dL 0.73   EGFR >60 mL/min/1.73m*2 >90   Calcium 8.6 - 10.3 mg/dL 9.3   Albumin 3.4 - 5.0 g/dL 4.3   Alkaline Phosphatase 33 - 110 U/L 110   ALT 7 - 45 U/L 22   AST 9 - 39 U/L 22   Bilirubin Total 0.0 - 1.2 mg/dL 0.6   HDL CHOLESTEROL mg/dL 57.0   Cholesterol/HDL Ratio  3.6   LDL Calculated <=99 mg/dL 92   VLDL 0 - 40 mg/dL 54 (H)   TRIGLYCERIDES 0 - 149 mg/dL 271 (H)   Non HDL Cholesterol 0 - 149 mg/dL 146   Total Protein 6.4 - 8.2 g/dL 6.6   CHOLESTEROL 0 - 199 mg/dL 203 (H)   Vitamin B12 211 - 911 pg/mL 375   Thyroid Stimulating Hormone 0.44 - 3.98 mIU/L 2.30   Vitamin D, 25-Hydroxy, Total 30 - 100 ng/mL  31   WBC 4.4 - 11.3 x10*3/uL 8.0   nRBC 0.0 - 0.0 /100 WBCs 0.0   RBC 4.00 - 5.20 x10*6/uL 4.78   HEMOGLOBIN 12.0 - 16.0 g/dL 13.4   HEMATOCRIT 36.0 - 46.0 % 41.4   MCV 80 - 100 fL 87   MCH 26.0 - 34.0 pg 28.0   MCHC 32.0 - 36.0 g/dL 32.4   RED CELL DISTRIBUTION WIDTH 11.5 - 14.5 % 13.2   Platelets 150 - 450 x10*3/uL 192   Neutrophils % 40.0 - 80.0 % 55.6   Immature Granulocytes %, Automated 0.0 - 0.9 % 0.3   Lymphocytes % 13.0 - 44.0 % 36.8   Monocytes % 2.0 - 10.0 % 5.1   Eosinophils % 0.0 - 6.0 % 1.8   Basophils % 0.0 - 2.0 % 0.4   Neutrophils Absolute 1.20 - 7.70 x10*3/uL 4.45   Immature Granulocytes Absolute, Automated 0.00 - 0.70 x10*3/uL 0.02   Lymphocytes Absolute 1.20 - 4.80 x10*3/uL 2.94   Monocytes Absolute 0.10 - 1.00 x10*3/uL 0.41   Eosinophils Absolute 0.00 - 0.70 x10*3/uL 0.14   Basophils Absolute 0.00 - 0.10 x10*3/uL 0.03   (L): Data is abnormally low  (H): Data is abnormally high    Assessment/Plan   Problem List Items Addressed This Visit       Hyperlipemia, mixed    Relevant Medications    atorvastatin (Lipitor) 10 mg tablet    Other Relevant Orders    CBC and Auto Differential    Comprehensive Metabolic Panel    Lipid Panel    TSH with reflex to Free T4 if abnormal    Vitamin B12    Vitamin B12 deficiency    Relevant Orders    CBC and Auto Differential    Comprehensive Metabolic Panel    Lipid Panel    TSH with reflex to Free T4 if abnormal    Vitamin B12     Other Visit Diagnoses       Well adult exam    -  Primary    Vitamin D deficiency        Relevant Orders    Vitamin D 25-Hydroxy,Total (for eval of Vitamin D levels)               Diamond Gill MD

## 2024-11-04 NOTE — PROGRESS NOTES
Subjective   Patient ID: Lona Dawson is a 54 y.o. female who presents for 6 month follow up.   Labs completed.     HPI     Review of Systems    Objective   LMP  (LMP Unknown) Comment: pt was recently on OCP x 2 years    Physical Exam    Assessment/Plan

## 2024-11-27 ENCOUNTER — OFFICE VISIT (OUTPATIENT)
Age: 54
End: 2024-11-27
Payer: COMMERCIAL

## 2024-11-27 VITALS
OXYGEN SATURATION: 98 % | WEIGHT: 195 LBS | DIASTOLIC BLOOD PRESSURE: 70 MMHG | HEART RATE: 73 BPM | HEIGHT: 64 IN | BODY MASS INDEX: 33.29 KG/M2 | SYSTOLIC BLOOD PRESSURE: 122 MMHG

## 2024-11-27 DIAGNOSIS — N30.00 ACUTE CYSTITIS WITHOUT HEMATURIA: Primary | ICD-10-CM

## 2024-11-27 DIAGNOSIS — R35.0 URINARY FREQUENCY: ICD-10-CM

## 2024-11-27 LAB
POC APPEARANCE, URINE: CLEAR
POC BILIRUBIN, URINE: NEGATIVE
POC BLOOD, URINE: NEGATIVE
POC COLOR, URINE: YELLOW
POC GLUCOSE, URINE: NEGATIVE MG/DL
POC KETONES, URINE: NEGATIVE MG/DL
POC LEUKOCYTES, URINE: NEGATIVE
POC NITRITE,URINE: NEGATIVE
POC PH, URINE: 6.5 PH
POC PROTEIN, URINE: NEGATIVE MG/DL
POC SPECIFIC GRAVITY, URINE: 1.01
POC UROBILINOGEN, URINE: 0.2 EU/DL

## 2024-11-27 PROCEDURE — 99213 OFFICE O/P EST LOW 20 MIN: CPT | Performed by: FAMILY MEDICINE

## 2024-11-27 PROCEDURE — 87086 URINE CULTURE/COLONY COUNT: CPT

## 2024-11-27 PROCEDURE — 81003 URINALYSIS AUTO W/O SCOPE: CPT | Performed by: FAMILY MEDICINE

## 2024-11-27 PROCEDURE — 3008F BODY MASS INDEX DOCD: CPT | Performed by: FAMILY MEDICINE

## 2024-11-27 PROCEDURE — 1036F TOBACCO NON-USER: CPT | Performed by: FAMILY MEDICINE

## 2024-11-27 RX ORDER — CIPROFLOXACIN 250 MG/1
250 TABLET, FILM COATED ORAL 2 TIMES DAILY
Qty: 10 TABLET | Refills: 0 | Status: SHIPPED | OUTPATIENT
Start: 2024-11-27 | End: 2024-12-02

## 2024-11-27 ASSESSMENT — PATIENT HEALTH QUESTIONNAIRE - PHQ9
1. LITTLE INTEREST OR PLEASURE IN DOING THINGS: NOT AT ALL
2. FEELING DOWN, DEPRESSED OR HOPELESS: NOT AT ALL
SUM OF ALL RESPONSES TO PHQ9 QUESTIONS 1 AND 2: 0

## 2024-11-27 NOTE — PROGRESS NOTES
Subjective   Lona Dawson is a 54 y.o. female who presents for Urinary Frequency (Frequency, Pressure x this morning ).  Here c/o woke up with burning, urinary frequency/urgency.  Just not feeling well.              Objective   Visit Vitals  /70   Pulse 73      Physical Exam  Vitals reviewed.   Constitutional:       General: She is not in acute distress.  Cardiovascular:      Rate and Rhythm: Normal rate.   Pulmonary:      Effort: Pulmonary effort is normal. No respiratory distress.   Skin:     General: Skin is warm and dry.   Neurological:      General: No focal deficit present.      Mental Status: She is alert. Mental status is at baseline.         Assessment/Plan   Problem List Items Addressed This Visit    None  Visit Diagnoses       Acute cystitis without hematuria    -  Primary    Relevant Medications    ciprofloxacin (Cipro) 250 mg tablet    Urinary frequency        Relevant Orders    POCT UA Automated manually resulted (Completed)    Urine Culture               Diamond Gill MD

## 2024-11-29 LAB — BACTERIA UR CULT: NORMAL

## 2025-05-04 LAB
25(OH)D3+25(OH)D2 SERPL-MCNC: NORMAL NG/ML
ALBUMIN SERPL-MCNC: NORMAL G/DL
ALP SERPL-CCNC: NORMAL U/L
ALT SERPL-CCNC: NORMAL U/L
ANION GAP SERPL CALCULATED.4IONS-SCNC: NORMAL MMOL/L
AST SERPL-CCNC: NORMAL U/L
BASOPHILS # BLD AUTO: 21 CELLS/UL (ref 0–200)
BASOPHILS NFR BLD AUTO: 0.4 %
BILIRUB SERPL-MCNC: NORMAL MG/DL
BUN SERPL-MCNC: NORMAL MG/DL
CALCIUM SERPL-MCNC: NORMAL MG/DL
CHLORIDE SERPL-SCNC: NORMAL MMOL/L
CHOLEST SERPL-MCNC: NORMAL MG/DL
CHOLEST/HDLC SERPL: NORMAL {RATIO}
CO2 SERPL-SCNC: NORMAL MMOL/L
CREAT SERPL-MCNC: NORMAL MG/DL
EGFRCR SERPLBLD CKD-EPI 2021: NORMAL ML/MIN/{1.73_M2}
EOSINOPHIL # BLD AUTO: 101 CELLS/UL (ref 15–500)
EOSINOPHIL NFR BLD AUTO: 1.9 %
ERYTHROCYTE [DISTWIDTH] IN BLOOD BY AUTOMATED COUNT: 14.5 % (ref 11–15)
GLUCOSE SERPL-MCNC: NORMAL MG/DL
HCT VFR BLD AUTO: 38.6 % (ref 35–45)
HDLC SERPL-MCNC: NORMAL MG/DL
HGB BLD-MCNC: 12.6 G/DL (ref 11.7–15.5)
LDLC SERPL CALC-MCNC: NORMAL MG/DL
LYMPHOCYTES # BLD AUTO: 2067 CELLS/UL (ref 850–3900)
LYMPHOCYTES NFR BLD AUTO: 39 %
MCH RBC QN AUTO: 28.1 PG (ref 27–33)
MCHC RBC AUTO-ENTMCNC: 32.6 G/DL (ref 32–36)
MCV RBC AUTO: 86 FL (ref 80–100)
MONOCYTES # BLD AUTO: 408 CELLS/UL (ref 200–950)
MONOCYTES NFR BLD AUTO: 7.7 %
NEUTROPHILS # BLD AUTO: 2703 CELLS/UL (ref 1500–7800)
NEUTROPHILS NFR BLD AUTO: 51 %
NONHDLC SERPL-MCNC: NORMAL MG/DL
PLATELET # BLD AUTO: 209 THOUSAND/UL (ref 140–400)
PMV BLD REES-ECKER: 10.4 FL (ref 7.5–12.5)
POTASSIUM SERPL-SCNC: NORMAL MMOL/L
PROT SERPL-MCNC: NORMAL G/DL
RBC # BLD AUTO: 4.49 MILLION/UL (ref 3.8–5.1)
SODIUM SERPL-SCNC: NORMAL MMOL/L
TRIGL SERPL-MCNC: NORMAL MG/DL
TSH SERPL-ACNC: NORMAL M[IU]/L
VIT B12 SERPL-MCNC: NORMAL PG/ML
WBC # BLD AUTO: 5.3 THOUSAND/UL (ref 3.8–10.8)

## 2025-05-05 ENCOUNTER — APPOINTMENT (OUTPATIENT)
Age: 55
End: 2025-05-05
Payer: COMMERCIAL

## 2025-05-05 VITALS
OXYGEN SATURATION: 98 % | DIASTOLIC BLOOD PRESSURE: 82 MMHG | SYSTOLIC BLOOD PRESSURE: 124 MMHG | BODY MASS INDEX: 33.99 KG/M2 | HEART RATE: 76 BPM | WEIGHT: 198 LBS

## 2025-05-05 DIAGNOSIS — E78.2 HYPERLIPEMIA, MIXED: Primary | ICD-10-CM

## 2025-05-05 DIAGNOSIS — E53.8 VITAMIN B12 DEFICIENCY: ICD-10-CM

## 2025-05-05 DIAGNOSIS — R79.89 LOW VITAMIN D LEVEL: ICD-10-CM

## 2025-05-05 DIAGNOSIS — E55.9 VITAMIN D DEFICIENCY: ICD-10-CM

## 2025-05-05 PROBLEM — R74.8 ELEVATED ALKALINE PHOSPHATASE LEVEL: Status: RESOLVED | Noted: 2023-12-21 | Resolved: 2025-05-05

## 2025-05-05 LAB
25(OH)D3+25(OH)D2 SERPL-MCNC: 36 NG/ML (ref 30–100)
ALBUMIN SERPL-MCNC: 3.9 G/DL (ref 3.6–5.1)
ALP SERPL-CCNC: 106 U/L (ref 37–153)
ALT SERPL-CCNC: 36 U/L (ref 6–29)
ANION GAP SERPL CALCULATED.4IONS-SCNC: 7 MMOL/L (CALC) (ref 7–17)
AST SERPL-CCNC: 25 U/L (ref 10–35)
BASOPHILS # BLD AUTO: 21 CELLS/UL (ref 0–200)
BASOPHILS NFR BLD AUTO: 0.4 %
BILIRUB SERPL-MCNC: 0.4 MG/DL (ref 0.2–1.2)
BUN SERPL-MCNC: 8 MG/DL (ref 7–25)
CALCIUM SERPL-MCNC: 8.4 MG/DL (ref 8.6–10.4)
CHLORIDE SERPL-SCNC: 107 MMOL/L (ref 98–110)
CHOLEST SERPL-MCNC: 150 MG/DL
CHOLEST/HDLC SERPL: 3.2 (CALC)
CO2 SERPL-SCNC: 26 MMOL/L (ref 20–32)
CREAT SERPL-MCNC: 0.64 MG/DL (ref 0.5–1.03)
EGFRCR SERPLBLD CKD-EPI 2021: 106 ML/MIN/1.73M2
EOSINOPHIL # BLD AUTO: 101 CELLS/UL (ref 15–500)
EOSINOPHIL NFR BLD AUTO: 1.9 %
ERYTHROCYTE [DISTWIDTH] IN BLOOD BY AUTOMATED COUNT: 14.5 % (ref 11–15)
GLUCOSE SERPL-MCNC: 90 MG/DL (ref 65–99)
HCT VFR BLD AUTO: 38.6 % (ref 35–45)
HDLC SERPL-MCNC: 47 MG/DL
HGB BLD-MCNC: 12.6 G/DL (ref 11.7–15.5)
LDLC SERPL CALC-MCNC: 76 MG/DL (CALC)
LYMPHOCYTES # BLD AUTO: 2067 CELLS/UL (ref 850–3900)
LYMPHOCYTES NFR BLD AUTO: 39 %
MCH RBC QN AUTO: 28.1 PG (ref 27–33)
MCHC RBC AUTO-ENTMCNC: 32.6 G/DL (ref 32–36)
MCV RBC AUTO: 86 FL (ref 80–100)
MONOCYTES # BLD AUTO: 408 CELLS/UL (ref 200–950)
MONOCYTES NFR BLD AUTO: 7.7 %
NEUTROPHILS # BLD AUTO: 2703 CELLS/UL (ref 1500–7800)
NEUTROPHILS NFR BLD AUTO: 51 %
NONHDLC SERPL-MCNC: 103 MG/DL (CALC)
PLATELET # BLD AUTO: 209 THOUSAND/UL (ref 140–400)
PMV BLD REES-ECKER: 10.4 FL (ref 7.5–12.5)
POTASSIUM SERPL-SCNC: 4.1 MMOL/L (ref 3.5–5.3)
PROT SERPL-MCNC: 6.3 G/DL (ref 6.1–8.1)
RBC # BLD AUTO: 4.49 MILLION/UL (ref 3.8–5.1)
SODIUM SERPL-SCNC: 140 MMOL/L (ref 135–146)
TRIGL SERPL-MCNC: 167 MG/DL
TSH SERPL-ACNC: 2.53 MIU/L
VIT B12 SERPL-MCNC: 533 PG/ML (ref 200–1100)
WBC # BLD AUTO: 5.3 THOUSAND/UL (ref 3.8–10.8)

## 2025-05-05 PROCEDURE — 99214 OFFICE O/P EST MOD 30 MIN: CPT | Performed by: FAMILY MEDICINE

## 2025-05-05 PROCEDURE — 1036F TOBACCO NON-USER: CPT | Performed by: FAMILY MEDICINE

## 2025-05-05 NOTE — PROGRESS NOTES
Subjective   Patient ID: Lona Dawson is a 54 y.o. female who presents for Follow-up (6 mo rev labs).  HPI    Review of Systems    Objective   Physical Exam    Assessment/Plan            Digna Louis MA 05/05/25 9:17 AM

## 2025-05-05 NOTE — PROGRESS NOTES
Subjective   Lona Dawson is a 54 y.o. female who presents for Follow-up (6 mo rev labs).  Here for follow up high chol, vitamin B12 def, elevated alk phos. Overall she is doing well.  No new concerns at this time.              Objective   Visit Vitals  /82   Pulse 76      Physical Exam  Vitals reviewed.   Constitutional:       General: She is not in acute distress.  Cardiovascular:      Rate and Rhythm: Normal rate and regular rhythm.      Heart sounds: No murmur heard.  Pulmonary:      Effort: Pulmonary effort is normal. No respiratory distress.      Breath sounds: Normal breath sounds.   Skin:     General: Skin is warm and dry.   Neurological:      General: No focal deficit present.      Mental Status: She is alert. Mental status is at baseline.        Latest Reference Range & Units 05/03/25 08:14   GLUCOSE 65 - 99 mg/dL 90   SODIUM 135 - 146 mmol/L 140   POTASSIUM 3.5 - 5.3 mmol/L 4.1   CHLORIDE 98 - 110 mmol/L 107   CARBON DIOXIDE 20 - 32 mmol/L 26   ELECTROLYTE BALANCE 7 - 17 mmol/L (calc) 7   UREA NITROGEN (BUN) 7 - 25 mg/dL 8   CREATININE 0.50 - 1.03 mg/dL 0.64   EGFR > OR = 60 mL/min/1.73m2 106   CALCIUM 8.6 - 10.4 mg/dL 8.4 (L)   ALBUMIN 3.6 - 5.1 g/dL 3.9   PROTEIN, TOTAL 6.1 - 8.1 g/dL 6.3   ALKALINE PHOSPHATASE 37 - 153 U/L 106   ALT 6 - 29 U/L 36 (H)   AST 10 - 35 U/L 25   BILIRUBIN, TOTAL 0.2 - 1.2 mg/dL 0.4   CHOLESTEROL, TOTAL <200 mg/dL 150   HDL CHOLESTEROL > OR = 50 mg/dL 47 (L)   CHOL/HDLC RATIO <5.0 (calc) 3.2   LDL-CHOLESTEROL mg/dL (calc) 76   TRIGLYCERIDES <150 mg/dL 167 (H)   NON HDL CHOLESTEROL <130 mg/dL (calc) 103   VITAMIN B12 200 - 1,100 pg/mL 533   TSH mIU/L 2.53   VITAMIN D,25-OH,TOTAL,IA 30 - 100 ng/mL 36   WHITE BLOOD CELL COUNT 3.8 - 10.8 Thousand/uL 5.3   RED BLOOD CELL COUNT 3.80 - 5.10 Million/uL 4.49   HEMOGLOBIN 11.7 - 15.5 g/dL 12.6   HEMATOCRIT 35.0 - 45.0 % 38.6   MCV 80.0 - 100.0 fL 86.0   MCH 27.0 - 33.0 pg 28.1   MCHC 32.0 - 36.0 g/dL 32.6   RDW 11.0 - 15.0 %  14.5   PLATELET COUNT 140 - 400 Thousand/uL 209   MPV 7.5 - 12.5 fL 10.4   ABSOLUTE NEUTROPHILS 1,500 - 7,800 cells/uL 2,703   ABSOLUTE LYMPHOCYTES 850 - 3,900 cells/uL 2,067   ABSOLUTE MONOCYTES 200 - 950 cells/uL 408   ABSOLUTE EOSINOPHILS 15 - 500 cells/uL 101   ABSOLUTE BASOPHILS 0 - 200 cells/uL 21   NEUTROPHILS % 51   LYMPHOCYTES % 39.0   MONOCYTES % 7.7   EOSINOPHILS % 1.9   BASOPHILS % 0.4   (L): Data is abnormally low  (H): Data is abnormally high    Assessment/Plan   Problem List Items Addressed This Visit       Hyperlipemia, mixed - Primary    Relevant Orders    CBC and Auto Differential    Comprehensive Metabolic Panel    Lipid Panel    TSH with reflex to Free T4 if abnormal    Vitamin B12    Low vitamin D level    Relevant Orders    CBC and Auto Differential    Comprehensive Metabolic Panel    Lipid Panel    TSH with reflex to Free T4 if abnormal    Vitamin B12    Vitamin B12 deficiency    Relevant Orders    CBC and Auto Differential    Comprehensive Metabolic Panel    Lipid Panel    TSH with reflex to Free T4 if abnormal    Vitamin B12     Other Visit Diagnoses         Vitamin D deficiency        Relevant Orders    Vitamin D 25-Hydroxy,Total (for eval of Vitamin D levels)               Diamond Gill MD

## 2025-08-14 DIAGNOSIS — R00.2 PALPITATIONS: ICD-10-CM

## 2025-08-14 RX ORDER — METOPROLOL SUCCINATE 50 MG/1
50 TABLET, EXTENDED RELEASE ORAL DAILY
Qty: 90 TABLET | Refills: 3 | Status: SHIPPED | OUTPATIENT
Start: 2025-08-14

## 2025-11-10 ENCOUNTER — APPOINTMENT (OUTPATIENT)
Age: 55
End: 2025-11-10
Payer: COMMERCIAL